# Patient Record
Sex: MALE | Race: WHITE | NOT HISPANIC OR LATINO | URBAN - METROPOLITAN AREA
[De-identification: names, ages, dates, MRNs, and addresses within clinical notes are randomized per-mention and may not be internally consistent; named-entity substitution may affect disease eponyms.]

---

## 2022-09-01 ENCOUNTER — TELEPHONE ENCOUNTER (OUTPATIENT)
Dept: URBAN - METROPOLITAN AREA CLINIC 35 | Facility: CLINIC | Age: 57
End: 2022-09-01

## 2022-09-01 ENCOUNTER — OFFICE VISIT (OUTPATIENT)
Dept: URBAN - METROPOLITAN AREA CLINIC 35 | Facility: CLINIC | Age: 57
End: 2022-09-01
Payer: COMMERCIAL

## 2022-09-01 ENCOUNTER — DASHBOARD ENCOUNTERS (OUTPATIENT)
Age: 57
End: 2022-09-01

## 2022-09-01 VITALS
OXYGEN SATURATION: 95 % | DIASTOLIC BLOOD PRESSURE: 96 MMHG | BODY MASS INDEX: 26.88 KG/M2 | SYSTOLIC BLOOD PRESSURE: 130 MMHG | HEIGHT: 71 IN | WEIGHT: 192 LBS | HEART RATE: 95 BPM

## 2022-09-01 DIAGNOSIS — R12 HEARTBURN: ICD-10-CM

## 2022-09-01 DIAGNOSIS — Z86.010 PERSONAL HISTORY OF COLONIC POLYPS: ICD-10-CM

## 2022-09-01 PROCEDURE — 99204 OFFICE O/P NEW MOD 45 MIN: CPT | Performed by: PHYSICIAN ASSISTANT

## 2022-09-01 RX ORDER — LORATADINE 10 MG/1
1 TABLET TABLET ORAL ONCE A DAY
Status: ACTIVE | COMMUNITY

## 2022-09-01 RX ORDER — ASPIRIN 81 MG/1
1 TABLET TABLET, COATED ORAL ONCE A DAY
Status: ACTIVE | COMMUNITY

## 2022-09-01 RX ORDER — SODIUM, POTASSIUM,MAG SULFATES 17.5-3.13G
177ML SOLUTION, RECONSTITUTED, ORAL ORAL
Qty: 1 | Refills: 0 | OUTPATIENT
Start: 2022-09-01 | End: 2022-09-03

## 2022-09-01 NOTE — HPI-PERSONAL HISTORY OF COLON POLYPS
57 year old male who presents today for a colorectal cancer consultation due to a personal history of colon polyps.  Currently, admits 2 bowel movements per day without strain.  Stools are formed. Denies melena, blood or mucous in stool, rectal pain/bleeding or pruritus ani.  Admits a family history of colon, gastric, or esophageal cancer/polyps or disease. Maternal Grandfather - esophageal cancer.          Last visit  (6/11/14) Colonoscopy revealed a smooth sessile polyp measuring less than 5 mm in the transverse and descending colon, a smooth sessile polyp measuring less than 5 mm in the sigmoid colon, a smooth semi-pedunculated polyp measuring 7 mm in the rectum, moderate diverticulosis in the sigmoid, and small internal hemorrhoids. PATHOLOGY: Transverse Colon Polyp Biopsy - Hyperplastic polyp. Descending Colon Polyp Biopsy - Hyperplastic polyp. Sigmoid Colon Polyp Biopsy - Hyperplastic polyp. Rectal Polyp Biopsy - Hyperplastic polyp.;

## 2022-09-01 NOTE — HPI-HEARTBURN
Patient admits to having heartburn only with spicy foods.  He states he won't take any medication for it, and states synptoms will resolve on their own.

## 2022-09-06 PROBLEM — 428283002: Status: ACTIVE | Noted: 2022-09-01

## 2022-10-11 ENCOUNTER — TELEPHONE ENCOUNTER (OUTPATIENT)
Dept: URBAN - METROPOLITAN AREA CLINIC 33 | Facility: CLINIC | Age: 57
End: 2022-10-11

## 2022-10-24 ENCOUNTER — CLAIMS CREATED FROM THE CLAIM WINDOW (OUTPATIENT)
Dept: URBAN - METROPOLITAN AREA CLINIC 4 | Facility: CLINIC | Age: 57
End: 2022-10-24
Payer: COMMERCIAL

## 2022-10-24 ENCOUNTER — OFFICE VISIT (OUTPATIENT)
Dept: URBAN - METROPOLITAN AREA SURGERY CENTER 8 | Facility: SURGERY CENTER | Age: 57
End: 2022-10-24
Payer: COMMERCIAL

## 2022-10-24 DIAGNOSIS — Z86.010 ADENOMAS PERSONAL HISTORY OF COLONIC POLYPS: ICD-10-CM

## 2022-10-24 DIAGNOSIS — K63.89 OTHER SPECIFIED DISEASES OF INTESTINE: ICD-10-CM

## 2022-10-24 DIAGNOSIS — K63.5 BENIGN COLON POLYP: ICD-10-CM

## 2022-10-24 PROCEDURE — G8907 PT DOC NO EVENTS ON DISCHARG: HCPCS | Performed by: INTERNAL MEDICINE

## 2022-10-24 PROCEDURE — 88305 TISSUE EXAM BY PATHOLOGIST: CPT | Performed by: PATHOLOGY

## 2022-10-24 PROCEDURE — 45385 COLONOSCOPY W/LESION REMOVAL: CPT | Performed by: INTERNAL MEDICINE

## 2022-11-17 ENCOUNTER — TELEPHONE ENCOUNTER (OUTPATIENT)
Dept: URBAN - METROPOLITAN AREA CLINIC 35 | Facility: CLINIC | Age: 57
End: 2022-11-17

## 2022-11-18 ENCOUNTER — OFFICE VISIT (OUTPATIENT)
Dept: URBAN - METROPOLITAN AREA CLINIC 35 | Facility: CLINIC | Age: 57
End: 2022-11-18

## 2022-11-18 NOTE — HPI-COLONOSCOPY FOLLOWUP
57 year old male patient presents today for a follow up from his colonoscopy. He admits/denies any complications after his procedure. He currently reports -- bowel movements per --, with/without strain. His stools are -- . He admits/denies any mucus, melena or blood in stools. Admits/Denies any pruritus ani or rectal pain.   Colonoscopy report shows: - Diverticulosis in the sigmoid colon and in the descending colon. - Non-bleeding internal hemorrhoids. - One 4 mm polyp in the sigmoid colon. Colon, Sigmoid, Polypectomy: HYPERPLASTIC POLYP(S).